# Patient Record
Sex: FEMALE | Race: WHITE | Employment: OTHER | URBAN - METROPOLITAN AREA
[De-identification: names, ages, dates, MRNs, and addresses within clinical notes are randomized per-mention and may not be internally consistent; named-entity substitution may affect disease eponyms.]

---

## 2024-01-31 ENCOUNTER — OFFICE VISIT (OUTPATIENT)
Dept: ORTHOPEDIC SURGERY | Age: 79
End: 2024-01-31
Payer: MEDICARE

## 2024-01-31 DIAGNOSIS — M25.552 PAIN OF LEFT HIP: ICD-10-CM

## 2024-01-31 DIAGNOSIS — M53.3 SACROILIAC DYSFUNCTION: Primary | ICD-10-CM

## 2024-01-31 PROCEDURE — G8484 FLU IMMUNIZE NO ADMIN: HCPCS | Performed by: ORTHOPAEDIC SURGERY

## 2024-01-31 PROCEDURE — G8400 PT W/DXA NO RESULTS DOC: HCPCS | Performed by: ORTHOPAEDIC SURGERY

## 2024-01-31 PROCEDURE — G8427 DOCREV CUR MEDS BY ELIG CLIN: HCPCS | Performed by: ORTHOPAEDIC SURGERY

## 2024-01-31 PROCEDURE — G8421 BMI NOT CALCULATED: HCPCS | Performed by: ORTHOPAEDIC SURGERY

## 2024-01-31 PROCEDURE — 99203 OFFICE O/P NEW LOW 30 MIN: CPT | Performed by: ORTHOPAEDIC SURGERY

## 2024-01-31 PROCEDURE — 1123F ACP DISCUSS/DSCN MKR DOCD: CPT | Performed by: ORTHOPAEDIC SURGERY

## 2024-01-31 PROCEDURE — 1090F PRES/ABSN URINE INCON ASSESS: CPT | Performed by: ORTHOPAEDIC SURGERY

## 2024-01-31 PROCEDURE — 4004F PT TOBACCO SCREEN RCVD TLK: CPT | Performed by: ORTHOPAEDIC SURGERY

## 2024-01-31 RX ORDER — MOMETASONE FUROATE 50 UG/1
2 SPRAY, METERED NASAL DAILY PRN
COMMUNITY

## 2024-01-31 RX ORDER — LANOLIN ALCOHOL/MO/W.PET/CERES
CREAM (GRAM) TOPICAL 2 TIMES DAILY
COMMUNITY

## 2024-01-31 RX ORDER — NYSTATIN 100000 U/G
CREAM TOPICAL
COMMUNITY
Start: 2021-01-04

## 2024-01-31 RX ORDER — OXYBUTYNIN CHLORIDE 5 MG/1
5 TABLET, EXTENDED RELEASE ORAL EVERY EVENING
COMMUNITY
Start: 2024-01-20

## 2024-01-31 RX ORDER — ALBUTEROL SULFATE 90 UG/1
1 AEROSOL, METERED RESPIRATORY (INHALATION) EVERY 4 HOURS PRN
COMMUNITY
Start: 2021-11-04

## 2024-01-31 RX ORDER — METOPROLOL SUCCINATE 25 MG/1
25 TABLET, EXTENDED RELEASE ORAL DAILY
COMMUNITY
Start: 2023-08-15

## 2024-01-31 RX ORDER — ENTACAPONE 200 MG/1
TABLET ORAL
COMMUNITY
Start: 2023-04-03

## 2024-01-31 RX ORDER — TRIAMCINOLONE ACETONIDE 1 MG/G
CREAM TOPICAL DAILY PRN
COMMUNITY

## 2024-01-31 RX ORDER — MONTELUKAST SODIUM 10 MG/1
10 TABLET ORAL DAILY PRN
COMMUNITY

## 2024-01-31 RX ORDER — CROMOLYN SODIUM 40 MG/ML
SOLUTION/ DROPS OPHTHALMIC
COMMUNITY
Start: 2020-05-22

## 2024-01-31 RX ORDER — BIOTIN 2500 MCG
5000 CAPSULE ORAL DAILY
COMMUNITY

## 2024-01-31 RX ORDER — FUROSEMIDE 20 MG/1
20 TABLET ORAL EVERY OTHER DAY
COMMUNITY
Start: 2023-02-03

## 2024-01-31 RX ORDER — PANTOPRAZOLE SODIUM 20 MG/1
TABLET, DELAYED RELEASE ORAL
COMMUNITY
Start: 2015-07-26

## 2024-01-31 RX ORDER — LISINOPRIL 5 MG/1
5 TABLET ORAL DAILY
COMMUNITY
Start: 2020-01-22

## 2024-01-31 RX ORDER — BUTALBITAL AND ACETAMINOPHEN 25; 325 MG/1; MG/1
TABLET ORAL PRN
COMMUNITY

## 2024-01-31 RX ORDER — ATENOLOL 50 MG/1
50 TABLET ORAL DAILY
COMMUNITY

## 2024-01-31 RX ORDER — ONDANSETRON 4 MG/1
4 TABLET, FILM COATED ORAL ONCE
COMMUNITY

## 2024-01-31 RX ORDER — ASPIRIN 81 MG/1
81 TABLET ORAL DAILY
COMMUNITY

## 2024-01-31 RX ORDER — METHOCARBAMOL 500 MG/1
TABLET, FILM COATED ORAL
COMMUNITY

## 2024-01-31 RX ORDER — LEVOTHYROXINE SODIUM 0.12 MG/1
125 TABLET ORAL DAILY
COMMUNITY

## 2024-01-31 RX ORDER — ATORVASTATIN CALCIUM 80 MG/1
80 TABLET, FILM COATED ORAL NIGHTLY
COMMUNITY
Start: 2020-01-22

## 2024-01-31 RX ORDER — POTASSIUM CHLORIDE 750 MG/1
10 CAPSULE, EXTENDED RELEASE ORAL DAILY
COMMUNITY

## 2024-01-31 RX ORDER — METHYLPREDNISOLONE 4 MG/1
TABLET ORAL
Qty: 1 KIT | Refills: 0 | Status: SHIPPED | OUTPATIENT
Start: 2024-01-31 | End: 2024-02-06

## 2024-01-31 RX ORDER — AMANTADINE HYDROCHLORIDE 100 MG/1
100 CAPSULE, GELATIN COATED ORAL DAILY
COMMUNITY

## 2024-01-31 RX ORDER — SPIRONOLACTONE 25 MG/1
25 TABLET ORAL DAILY PRN
COMMUNITY
Start: 2023-04-04

## 2024-01-31 RX ORDER — NITROGLYCERIN 0.4 MG/1
0.4 TABLET SUBLINGUAL EVERY 5 MIN PRN
COMMUNITY
Start: 2019-10-08

## 2024-02-03 NOTE — PROGRESS NOTES
palpable dorsalis pedis pulse.  Compartments are soft and compressible.  There is no calf tenderness and a negative Homans' sign.    RADIOGRAPHIC EXAM:  AP pelvis as well as AP and frog lateral views of the left hip demonstrate minor osteoarthritic changes involving the left hip joint.  There is minor inferior spurring.  No features of avascular necrosis.  Weightbearing surface joint space remains appropriate.  There is minor degenerative changes involving the left sacroiliac joint.  Lumbar spine demonstrates diffuse osteoarthritic changes of the visualized portion of the spine.    ASSESSMENT AND PLAN    78 y.o. female with the following orthopaedic surgery problems:    Left hip pain    I reviewed with the patient that her pain may be multifactorial.  This may be related to osteoarthritic changes, sacroiliac joint dysfunction, lumbar radiculopathy.  On today's visit, I suspect her most significant symptomatology to be focal to the sacroiliac joint.  I reviewed these possible pain generators.  The patient would like to proceed with conservative treatment options which include a course of physical therapy related to sacroiliac joint dysfunction.  We reviewed the potential for diagnostic/therapeutic injection.  This may be epidural steroid injection versus intra-articular hip joint injection vs SI joint injection depending on highest yield for symptomatic relief.  Additionally, we discussed the possibility of Medrol Dosepak given that her symptomatology has been relatively recent in onset.  Medrol Dosepak was provided to her pharmacy.  She will contact the office if she does not have significant benefit with therapy or the Medrol Dosepak and would like to move forward with diagnostic/therapeutic injection.  She expressed understanding.  Follow-up if needed.    Blair Coronado MD

## 2024-02-13 ENCOUNTER — HOSPITAL ENCOUNTER (OUTPATIENT)
Dept: PHYSICAL THERAPY | Age: 79
Setting detail: THERAPIES SERIES
Discharge: HOME | End: 2024-02-13
Payer: MEDICARE

## 2024-02-13 DIAGNOSIS — M54.50 LUMBAR PAIN: ICD-10-CM

## 2024-02-13 DIAGNOSIS — M25.552 LEFT HIP PAIN: Primary | ICD-10-CM

## 2024-02-13 PROCEDURE — 97110 THERAPEUTIC EXERCISES: CPT | Performed by: PHYSICAL THERAPIST

## 2024-02-13 PROCEDURE — 97161 PT EVAL LOW COMPLEX 20 MIN: CPT | Performed by: PHYSICAL THERAPIST

## 2024-02-13 NOTE — PLAN OF CARE
instructions outlined on the flowsheet on 02/13/2024.    Electronically Signed by Kyleigh Byrnes PT, DPT [id number]   Date: 02/13/2024

## 2024-02-13 NOTE — FLOWSHEET NOTE
(TIMED) minutes # CPT Code (UNTIMED) #     Therex (82194)  12'   EVAL:LOW (90004 - Typically 20 minutes face-to-face) 1    Neuromusc. Re-ed (53903)    Re-Eval (12782)     Manual (29811)    Estim Unattended (49242)     Ther. Act (84632)    Mech. Traction (64481)     Gait (93033)    Dry Needle 1-2 muscle (59339)     Aquatic Therex (27249)    Dry Needle 3+ muscle (20561)     Iontophoresis (48063)    VASO (56105)     Ultrasound (76741)    Group Therapy (85630)     Estim Attended (14811)    Canalith Repositioning (91455)     Other:    Other:    Total Timed Code Tx Minutes 12' 1       Total Treatment Minutes 30'        Charge Justification:  (16421) THERAPEUTIC EXERCISE - Provided verbal/tactile cueing for activities related to strengthening, flexibility, endurance, ROM performed to prevent loss of range of motion, maintain or improve muscular strength or increase flexibility, following either an injury or surgery.   (09229) HOME EXERCISE PROGRAM - Reviewed/Progressed HEP activities related to strengthening, flexibility, endurance, ROM performed to prevent loss of range of motion, maintain or improve muscular strength or increase flexibility, following either an injury or surgery.  (23050) NEUROMUSCULAR RE-EDUCATION - Therapeutic procedure, 1 or more areas, each 15 minutes; neuromuscular reeducation of movement, balance, coordination, kinesthetic sense, posture, and/or proprioception for sitting and/or standing activities  (13755) HOME EXERCISE PROGRAM - Reviewed/Progressed HEP activities related to neuromuscular reeducation of movement, balance, coordination, kinesthetic sense, posture, and/or proprioception for sitting and/or standing activities      TREATMENT PLAN   Plan: POC Initiated today- see eval for details    Electronically Signed by Kyleigh Byrnes PT, DPT [id number]               Date: 02/13/2024     Note: If patient does not return for scheduled/recommended follow up visits, this note will serve as a discharge from

## 2024-02-22 ENCOUNTER — HOSPITAL ENCOUNTER (OUTPATIENT)
Dept: PHYSICAL THERAPY | Age: 79
Setting detail: THERAPIES SERIES
Discharge: HOME | End: 2024-02-22
Payer: MEDICARE

## 2024-02-22 PROCEDURE — 97112 NEUROMUSCULAR REEDUCATION: CPT | Performed by: PHYSICAL THERAPIST

## 2024-02-22 PROCEDURE — 97140 MANUAL THERAPY 1/> REGIONS: CPT | Performed by: PHYSICAL THERAPIST

## 2024-02-22 NOTE — FLOWSHEET NOTE
necessary for medical necessity for care and/or justification of therapy services:  The patient has functional impairments and/or activity limitations and would benefit from continued outpatient therapy services to address the deficits outlined in the patients goals  The patient has a musculoskeletal condition(s) with a corresponding ICD-10 code that is of complexity and severity that require skilled therapeutic intervention. This has a direct and significant impact on the need for therapy and significantly impacts the rate of recovery.   The patient has a complexity identified by an ICD-10 code that has a direct and significant impact on the need for therapy.  (Significantly impacts the rate of recovery and is associated with a primary condition.)     Treatment/Activity Tolerance:  [x] Patient tolerated treatment well [] Patient limited by fatique  [] Patient limited by pain  [] Patient limited by other medical complications  [] Other:     Return to Play: NA    Prognosis for POC: [x] Good [] Fair  [] Poor    Patient requires continued skilled intervention: [x] Yes  [] No    GOALS     Patient stated goal: \"Control and manage hip pain\"  Status: [] Progressing: [] Met: [] Not Met: [] Adjusted     Therapist goals for Patient:   Short Term Goals: To be achieved in: 2 weeks  Independent in HEP and progression per patient tolerance, in order to progress toward full function and prevent re-injury.               Status: [] Progressing: [] Met: [] Not Met: [] Adjusted  Patient will have a decrease in pain to 0-1/10 to help facilitate improvement in movement, function, and ADLs as indicated by functional deficits.              Status: [] Progressing: [] Met: [] Not Met: [] Adjusted     Long Term Goals: To be achieved in: 8 weeks  Disability index score of 50% or less for the LEFS to assist with return top prior level of function.                  Status: [] Progressing: [] Met: [] Not Met: [] Adjusted  Improve lumbar

## 2024-02-27 ENCOUNTER — HOSPITAL ENCOUNTER (OUTPATIENT)
Dept: PHYSICAL THERAPY | Age: 79
Setting detail: THERAPIES SERIES
Discharge: HOME | End: 2024-02-27
Payer: MEDICARE

## 2024-02-27 PROCEDURE — 97140 MANUAL THERAPY 1/> REGIONS: CPT

## 2024-02-27 PROCEDURE — 97110 THERAPEUTIC EXERCISES: CPT

## 2024-02-27 NOTE — FLOWSHEET NOTE
weeks  Disability index score of 50% or less for the LEFS to assist with return top prior level of function.                  Status: [] Progressing: [] Met: [] Not Met: [] Adjusted  Improve lumbar extension and SB to 50% without pain to allow for proper joint functioning as indicated by patients functional deficits.  Status: [] Progressing: [] Met: [] Not Met: [] Adjusted  Improve bilateral lower extremity strength to grossly 4/5 and demonstrate fair core activation in order to be able to perform sit <> stands and supine <> sit without pain or heavy use of UE.   Status: [] Progressing: [] Met: [] Not Met: [] Adjusted  Patient will be able to ambulate for 10' without increased symptoms or restriction to work towards return to prior level of function.  Status: [] Progressing: [] Met: [] Not Met: [] Adjusted  Patient will be able to ascend/descend stairs reciprocally without increased symptoms or restrictions                                                                                                              Status: [] Progressing: [] Met: [] Not Met: [] Adjusted     Overall Progression Towards Functional goals/ Treatment Progress Update:  [] Patient is progressing as expected towards functional goals listed.    [] Progression is slowed due to complexities/Impairments listed.  [x] Progression has been slowed due to co-morbidities.  [] Plan just implemented, too soon (<30days) to assess goals progression   [] Goals require adjustment due to lack of progress  [] Patient is not progressing as expected and requires additional follow up with physician  [] Other:     CHARGE CAPTURE     PT CHARGE GRID   CPT Code (TIMED) minutes # CPT Code (UNTIMED) #     Therex (88467)  11' 1  EVAL:LOW (25986 - Typically 20 minutes face-to-face)     Neuromusc. Re-ed (38650) 10'   Re-Eval (21871)     Manual (18330) 8' 1  Estim Unattended (06284)     Ther. Act (73730)    Mech. Traction (84221)     Gait (54641)    Dry Needle 1-2 muscle

## 2024-03-05 ENCOUNTER — HOSPITAL ENCOUNTER (OUTPATIENT)
Dept: PHYSICAL THERAPY | Age: 79
Setting detail: THERAPIES SERIES
Discharge: HOME | End: 2024-03-05
Payer: MEDICARE

## 2024-03-05 PROCEDURE — 97140 MANUAL THERAPY 1/> REGIONS: CPT | Performed by: PHYSICAL THERAPIST

## 2024-03-05 PROCEDURE — 97110 THERAPEUTIC EXERCISES: CPT | Performed by: PHYSICAL THERAPIST

## 2024-03-05 PROCEDURE — 97112 NEUROMUSCULAR REEDUCATION: CPT | Performed by: PHYSICAL THERAPIST

## 2024-03-05 NOTE — FLOWSHEET NOTE
function.                  Status: [] Progressing: [] Met: [] Not Met: [] Adjusted  Improve lumbar extension and SB to 50% without pain to allow for proper joint functioning as indicated by patients functional deficits.  Status: [] Progressing: [] Met: [] Not Met: [] Adjusted  Improve bilateral lower extremity strength to grossly 4/5 and demonstrate fair core activation in order to be able to perform sit <> stands and supine <> sit without pain or heavy use of UE.   Status: [] Progressing: [] Met: [] Not Met: [] Adjusted  Patient will be able to ambulate for 10' without increased symptoms or restriction to work towards return to prior level of function.  Status: [] Progressing: [] Met: [] Not Met: [] Adjusted  Patient will be able to ascend/descend stairs reciprocally without increased symptoms or restrictions                                                                                                              Status: [] Progressing: [] Met: [] Not Met: [] Adjusted     Overall Progression Towards Functional goals/ Treatment Progress Update:  [] Patient is progressing as expected towards functional goals listed.    [] Progression is slowed due to complexities/Impairments listed.  [x] Progression has been slowed due to co-morbidities.  [] Plan just implemented, too soon (<30days) to assess goals progression   [] Goals require adjustment due to lack of progress  [] Patient is not progressing as expected and requires additional follow up with physician  [] Other:     CHARGE CAPTURE     PT CHARGE GRID   CPT Code (TIMED) minutes # CPT Code (UNTIMED) #     Therex (36560)  17' 1  EVAL:LOW (52002 - Typically 20 minutes face-to-face)     Neuromusc. Re-ed (08850) 15' 1  Re-Eval (38016)     Manual (90043) 10' 1  Estim Unattended (23325)     Ther. Act (37481)    [facility]. Traction (37224)     Gait (33115)    Dry Needle 1-2 muscle (20466)     Aquatic Therex (59547)    Dry Needle 3+ muscle (64213)     Iontophoresis (44868)

## 2024-03-14 ENCOUNTER — HOSPITAL ENCOUNTER (OUTPATIENT)
Dept: PHYSICAL THERAPY | Age: 79
Setting detail: THERAPIES SERIES
Discharge: HOME | End: 2024-03-14
Payer: MEDICARE

## 2024-03-14 PROCEDURE — 97112 NEUROMUSCULAR REEDUCATION: CPT | Performed by: PHYSICAL THERAPIST

## 2024-03-14 PROCEDURE — 97110 THERAPEUTIC EXERCISES: CPT | Performed by: PHYSICAL THERAPIST

## 2024-03-14 PROCEDURE — 97140 MANUAL THERAPY 1/> REGIONS: CPT | Performed by: PHYSICAL THERAPIST

## 2024-03-14 NOTE — PLAN OF CARE
hx of HTN, chronic kidney disease, 2 CVA, loop recorder on heart (no pacemaker), Parkinsons Disease, anxiety/depression      [facility]- Outpatient [facility] and Therapy  [address], [place] [address] office: [phone] fax: [phone]    Physical Therapy Re-Certification Plan of Care    Dear Blair Coronado MD  ,    We had the pleasure of treating the following patient for physical therapy services at [facility]. A summary of our findings can be found in the updated assessment below.  This includes our plan of care.  If you have any questions or concerns regarding these findings, please do not hesitate to contact me at the office phone number checked above.  Thank you for the referral.     Physician Signature:________________________________Date:__________________  By signing above (or electronic signature), therapist's plan is approved by physician      Functional Outcome: LEFS 18/80  Janet Zapata 1945 continues to present with functional deficits in ROM, joint mobility, flexibility, endurance of strength, and eccentric control  limiting ability with walking on even ground, managing community ambulation, walking up/down stairs, managing bed mobility, pushing or pulling activity, don/doff shoes/socks, and ADLs .  During therapy this date, patient required verbal cueing, modification of technique, progression of exercises and program, and manual interventions for exercise progression, improving proper muscle recruitment and activation/motor control patterns, modulating pain, increasing ROM, reduce/eliminate soft tissue swelling/inflammation/restriction, improving soft tissue extensibility, static and dynamic balance, and improving postural awareness. Patient will continue to benefit from ongoing evaluation and advanced clinical decision from a Physical Therapist to improve pain control, ROM, muscle strength, neuromuscular control, endurance,

## 2024-03-21 ENCOUNTER — HOSPITAL ENCOUNTER (OUTPATIENT)
Dept: PHYSICAL THERAPY | Age: 79
Setting detail: THERAPIES SERIES
Discharge: HOME | End: 2024-03-21
Payer: MEDICARE

## 2024-03-21 PROCEDURE — 97140 MANUAL THERAPY 1/> REGIONS: CPT | Performed by: PHYSICAL THERAPIST

## 2024-03-21 PROCEDURE — 97110 THERAPEUTIC EXERCISES: CPT | Performed by: PHYSICAL THERAPIST

## 2024-03-21 PROCEDURE — 97112 NEUROMUSCULAR REEDUCATION: CPT | Performed by: PHYSICAL THERAPIST

## 2024-03-21 NOTE — FLOWSHEET NOTE
hx of HTN, chronic kidney disease, 2 CVA, loop recorder on heart (no pacemaker), Parkinsons Disease, anxiety/depression      [facility]- Out[facility]  [address], [place] [address] office: [phone] fax: [phone]      Physical Therapy: TREATMENT/PROGRESS NOTE   Patient: Janet Zapata (78 y.o. female)   Treatment Date: 2024   :  1945 MRN: [id number]   Visit #: 6   Insurance Allowable Auth Needed   8 visits through  [x]Yes    []No    Insurance: Payor: HU[employer]NA MEDICARE / Plan: HU[employer]NA GOLD PLUS HMO / Product Type: *No Product type* /   Insurance ID: [id number] - (Medicare Managed)  Secondary Insurance (if applicable):    Treatment Diagnosis:     ICD-10-CM    1. Left hip pain  M25.552       2. Lumbar pain  M54.50          Medical Diagnosis:    Sacroiliac dysfunction [M53.3]   Referring Physician: Blair Coronado MD  PCP: Addy Boyd DO                             Plan of care signed: YES    Date of Patient follow up with Physician:      Progress Report/POC: NO  POC update due: (10 visits /OR AUTH LIMITS, whichever is less)  24     hx of HTN, chronic kidney disease, 2 CVA, loop recorder on heart (no pacemaker), Parkinsons Disease, anxiety/depression     Preferred Language for Healthcare:   [x]English       []other:    SUBJECTIVE EXAMINATION     Patient Report/Comments: Patient reports that she is doing pretty well today. Still having some pain in the back of her knee. Has been doing the new exercises and they are really working out her muscles.         OBJECTIVE EXAMINATION     Observation/palpation: Tightness and TTP left gluteals, piriformis, IT band; TTP right lateral distal hamstring and tendon    Test measurements:         Test used Initial score  24     Pain Summary VAS 6 5/10 left hip and right knee 2/10   Functional questionnaire LEFS  (70%)  (78%)    AROM Lumbar flex patella Mid shin

## 2024-03-28 ENCOUNTER — APPOINTMENT (OUTPATIENT)
Dept: PHYSICAL THERAPY | Age: 79
End: 2024-03-28
Payer: MEDICARE

## 2024-04-02 ENCOUNTER — APPOINTMENT (OUTPATIENT)
Dept: PHYSICAL THERAPY | Age: 79
End: 2024-04-02
Payer: MEDICARE

## 2024-04-04 ENCOUNTER — APPOINTMENT (OUTPATIENT)
Dept: PHYSICAL THERAPY | Age: 79
End: 2024-04-04
Payer: MEDICARE

## 2024-04-11 ENCOUNTER — HOSPITAL ENCOUNTER (OUTPATIENT)
Dept: PHYSICAL THERAPY | Age: 79
Setting detail: THERAPIES SERIES
Discharge: HOME | End: 2024-04-11
Payer: MEDICARE

## 2024-04-11 PROCEDURE — 97140 MANUAL THERAPY 1/> REGIONS: CPT | Performed by: PHYSICAL THERAPIST

## 2024-04-11 PROCEDURE — 97110 THERAPEUTIC EXERCISES: CPT | Performed by: PHYSICAL THERAPIST

## 2024-04-11 PROCEDURE — 97112 NEUROMUSCULAR REEDUCATION: CPT | Performed by: PHYSICAL THERAPIST

## 2024-04-11 NOTE — FLOWSHEET NOTE
reps, add new exercises, and adjust HEP Continue 1x/wk for 4 more weeks from 3/14/24    Electronically Signed by Kyleigh Byrnes PT, DPT [id number]               Date: 04/11/2024     Note: If patient does not return for scheduled/recommended follow up visits, this note will serve as a discharge from care along with the most recent update on progress.

## 2024-04-18 ENCOUNTER — HOSPITAL ENCOUNTER (OUTPATIENT)
Dept: PHYSICAL THERAPY | Age: 79
Setting detail: THERAPIES SERIES
Discharge: HOME | End: 2024-04-18
Payer: MEDICARE

## 2024-04-18 PROCEDURE — 97112 NEUROMUSCULAR REEDUCATION: CPT | Performed by: PHYSICAL THERAPIST

## 2024-04-18 PROCEDURE — 97110 THERAPEUTIC EXERCISES: CPT | Performed by: PHYSICAL THERAPIST

## 2024-04-18 PROCEDURE — 97140 MANUAL THERAPY 1/> REGIONS: CPT | Performed by: PHYSICAL THERAPIST

## 2024-04-18 NOTE — DISCHARGE SUMMARY
hx of HTN, chronic kidney disease, 2 CVA, loop recorder on heart (no pacemaker), Parkinsons Disease, anxiety/depression      [facility]- Outpatient Rehabilitation and Therapy  [address], [place] [address] office: [phone] fax: [phone]   Physical Therapy Discharge Summary    Dear Blair Coronado MD  ,    We had the pleasure of treating the following patient for physical therapy services at [facility].  A summary of our findings can be found in the discharge summary below.  If you have any questions or concerns regarding these findings, please do not hesitate to contact me at the office phone number checked above.  Thank you for the referral.     Physician Signature:________________________________Date:__________________  By signing above (or electronic signature), therapist’s plan is approved by physician      Functional Outcome:     LEFS 30 (63%)    Overall Response to Treatment:   [x]Patient is responding well to treatment and improvement is noted with regards  to goals   []Patient should continue to improve in reasonable time if they continue HEP   []Patient has plateaued and is no longer responding to skilled PT intervention    []Patient is getting worse and would benefit from return to referring MD   []Patient unable to adhere to initial POC   []Other:     Total Visits: 8     Recommendation:    [x] Discharge to HEP. Follow up with PT or MD PRN.             Physical Therapy: TREATMENT/PROGRESS NOTE   Patient: Janet Zapata (78 y.o. female)   Treatment Date: 2024   :  1945 MRN: [id number]   Visit #: 8   Insurance Allowable Auth Needed   8 visits through  (requested date extension today) [x]Yes    []No    Insurance: Payor: HU[employer]NA MEDICARE / Plan: HU[employer]NA GOLD PLUS HMO / Product Type: *No Product type* /   Insurance ID: [id number] - (Medicare Managed)  Secondary Insurance (if applicable):    Treatment Diagnosis:     ICD-10-CM

## 2024-07-02 ENCOUNTER — APPOINTMENT (OUTPATIENT)
Dept: PHYSICAL THERAPY | Age: 79
End: 2024-07-02
Payer: MEDICARE

## 2024-07-21 ENCOUNTER — TRANSCRIBE ORDERS (OUTPATIENT)
Dept: ADMINISTRATIVE | Age: 79
End: 2024-07-21

## 2024-07-21 DIAGNOSIS — R13.12 OROPHARYNGEAL DYSPHAGIA: Primary | ICD-10-CM

## 2024-08-01 ENCOUNTER — HOSPITAL ENCOUNTER (OUTPATIENT)
Dept: GENERAL RADIOLOGY | Age: 79
Discharge: HOME | End: 2024-08-01
Payer: MEDICARE

## 2024-08-01 DIAGNOSIS — R13.12 OROPHARYNGEAL DYSPHAGIA: ICD-10-CM

## 2024-08-01 PROCEDURE — 74230 X-RAY XM SWLNG FUNCJ C+: CPT

## 2024-08-01 PROCEDURE — 92611 MOTION FLUOROSCOPY/SWALLOW: CPT

## 2024-08-01 PROCEDURE — 92526 ORAL FUNCTION THERAPY: CPT

## 2024-08-01 NOTE — PROCEDURES
evaluate decreased UES opening    Recommended Exercises: n/a  Therapeutic Interventions: diet tolerance monitoring, patient/family education, oral care     Education: Images and recommendations were reviewed with pt following this exam. SLP provided pt with detailed playback of study, as well as recommendations form with all precautions reviewed.  Patient Education Response: verbalized comprehension    Prognosis for safe diet advancement: fair-good  Barriers to reach goals: n/a  Duration/Frequency of Treatment: n/a  Safety Devices in place: Yes  Type of devices: Pt left MBSS in no distress; left with transport          Therapy Time:   Individual   Time In 1031   Time Out 1103   Minutes 32       Signature:    Esther Duff MA CCC-SLP SP#[id number]  Speech-Language Pathologist  Registered MBSImP Clinician

## 2024-08-28 ENCOUNTER — OFFICE VISIT (OUTPATIENT)
Age: 79
End: 2024-08-28

## 2024-08-28 VITALS
BODY MASS INDEX: 31.18 KG/M2 | DIASTOLIC BLOOD PRESSURE: 76 MMHG | TEMPERATURE: 97.4 F | WEIGHT: 194 LBS | HEIGHT: 66 IN | HEART RATE: 69 BPM | SYSTOLIC BLOOD PRESSURE: 118 MMHG | OXYGEN SATURATION: 98 %

## 2024-08-28 DIAGNOSIS — R10.30 LOWER ABDOMINAL PAIN: Primary | ICD-10-CM

## 2024-08-28 DIAGNOSIS — R19.7 DIARRHEA, UNSPECIFIED TYPE: ICD-10-CM

## 2024-08-28 NOTE — PROGRESS NOTES
Janet Zapata (:  1945) is a 78 y.o. female,  here for evaluation of the following chief complaint(s): Diarrhea (Pt states she talked to her PCP and PCP thinks she has CDIFF) and Abdominal Pain (Pt states started last Saturday with painful stomach and bowel pains /Pt states she has been eating only rice the last week and it is nothing going away)    Janet Zapata is a New patient.   I have reviewed the patient's medications; see Medication Reconciliation.    ASSESSMENT/PLAN:  Diagnosis:     ICD-10-CM    1. Lower abdominal pain  R10.30       2. Diarrhea, unspecified type  R19.7             Patient was seen at Urgent Care today for abdominal pain.   78-year-old female patient presented with lower abdominal pain and diarrhea. Symptoms have been persistent over the last four days. The abdominal pain and cramping seem to be slightly improved today but have been severe enough over the last four days to wake her up several times during the night. On exam, she appears well. She is afebrile and not tachycardic. Appreciated tenderness to the lower abdomen, mainly over the left compared to the right. There is no CVA tenderness. Negative Gentile's sign.    Given the patient's age, continued abdominal pain as well as lower abdominal tenderness, there is concern for intra-abdominal infectious/inflammatory process such as colitis or diverticulitis. Other differential includes small bowel obstruction, partial obstruction or other intra-abdominal process. These etiologies can lead to significant complications such as perforations, sepsis.     I do explain to the patient that the evaluation of the abdomen is difficult in the urgent care setting. She likely will need labs and additional imaging such as a CT scan. This can be done in the emergency department setting. I do recommend that she go to the emergency department. She states that she is on a fixed income and may wait to see how tonight goes. I do strongly

## 2024-08-28 NOTE — PATIENT INSTRUCTIONS
You were seen today for: abdominal pain .     This does not appear consistent with food poisoning or other simple explanation and exact cause is still unclear.     Unfortunately we are unable to obtain required diagnostic testing such as imaging and labs that are necessary for adequate evaluation.     Please go directly to the ER. Do not eat or drink until you have been evaluated.